# Patient Record
Sex: MALE
[De-identification: names, ages, dates, MRNs, and addresses within clinical notes are randomized per-mention and may not be internally consistent; named-entity substitution may affect disease eponyms.]

---

## 2017-06-20 PROBLEM — Z00.00 ENCOUNTER FOR PREVENTIVE HEALTH EXAMINATION: Status: ACTIVE | Noted: 2017-06-20

## 2017-12-18 ENCOUNTER — RX RENEWAL (OUTPATIENT)
Age: 67
End: 2017-12-18

## 2018-01-14 ENCOUNTER — RESULT CHARGE (OUTPATIENT)
Age: 68
End: 2018-01-14

## 2018-01-15 ENCOUNTER — APPOINTMENT (OUTPATIENT)
Dept: HEART AND VASCULAR | Facility: CLINIC | Age: 68
End: 2018-01-15

## 2018-01-15 ENCOUNTER — APPOINTMENT (OUTPATIENT)
Dept: HEART AND VASCULAR | Facility: CLINIC | Age: 68
End: 2018-01-15
Payer: MEDICARE

## 2018-01-15 VITALS
OXYGEN SATURATION: 98 % | HEART RATE: 69 BPM | WEIGHT: 189.99 LBS | TEMPERATURE: 98.3 F | BODY MASS INDEX: 28.79 KG/M2 | HEIGHT: 68 IN | SYSTOLIC BLOOD PRESSURE: 132 MMHG | DIASTOLIC BLOOD PRESSURE: 80 MMHG

## 2018-01-15 DIAGNOSIS — J34.9 UNSPECIFIED DISORDER OF EAR, UNSPECIFIED EAR: ICD-10-CM

## 2018-01-15 DIAGNOSIS — Q61.5 MEDULLARY CYSTIC KIDNEY: ICD-10-CM

## 2018-01-15 DIAGNOSIS — Z12.9 ENCOUNTER FOR SCREENING FOR MALIGNANT NEOPLASM, SITE UNSPECIFIED: ICD-10-CM

## 2018-01-15 DIAGNOSIS — Z86.69 PERSONAL HISTORY OF OTHER DISEASES OF THE NERVOUS SYSTEM AND SENSE ORGANS: ICD-10-CM

## 2018-01-15 DIAGNOSIS — H93.90 UNSPECIFIED DISORDER OF EAR, UNSPECIFIED EAR: ICD-10-CM

## 2018-01-15 DIAGNOSIS — Z83.3 FAMILY HISTORY OF DIABETES MELLITUS: ICD-10-CM

## 2018-01-15 DIAGNOSIS — Z87.442 PERSONAL HISTORY OF URINARY CALCULI: ICD-10-CM

## 2018-01-15 DIAGNOSIS — Z86.79 PERSONAL HISTORY OF OTHER DISEASES OF THE CIRCULATORY SYSTEM: ICD-10-CM

## 2018-01-15 DIAGNOSIS — Z87.898 PERSONAL HISTORY OF OTHER SPECIFIED CONDITIONS: ICD-10-CM

## 2018-01-15 DIAGNOSIS — Z80.8 FAMILY HISTORY OF MALIGNANT NEOPLASM OF OTHER ORGANS OR SYSTEMS: ICD-10-CM

## 2018-01-15 DIAGNOSIS — Z78.9 OTHER SPECIFIED HEALTH STATUS: ICD-10-CM

## 2018-01-15 DIAGNOSIS — J39.2 UNSPECIFIED DISORDER OF EAR, UNSPECIFIED EAR: ICD-10-CM

## 2018-01-15 DIAGNOSIS — R73.03 PREDIABETES.: ICD-10-CM

## 2018-01-15 DIAGNOSIS — Z98.890 OTHER SPECIFIED POSTPROCEDURAL STATES: ICD-10-CM

## 2018-01-15 DIAGNOSIS — Z87.19 PERSONAL HISTORY OF OTHER DISEASES OF THE DIGESTIVE SYSTEM: ICD-10-CM

## 2018-01-15 DIAGNOSIS — Z86.39 PERSONAL HISTORY OF OTHER ENDOCRINE, NUTRITIONAL AND METABOLIC DISEASE: ICD-10-CM

## 2018-01-15 DIAGNOSIS — Z80.9 FAMILY HISTORY OF MALIGNANT NEOPLASM, UNSPECIFIED: ICD-10-CM

## 2018-01-15 DIAGNOSIS — Z85.9 PERSONAL HISTORY OF MALIGNANT NEOPLASM, UNSPECIFIED: ICD-10-CM

## 2018-01-15 DIAGNOSIS — Z72.0 TOBACCO USE: ICD-10-CM

## 2018-01-15 PROCEDURE — 93000 ELECTROCARDIOGRAM COMPLETE: CPT

## 2018-01-15 PROCEDURE — 36415 COLL VENOUS BLD VENIPUNCTURE: CPT

## 2018-01-15 PROCEDURE — 99214 OFFICE O/P EST MOD 30 MIN: CPT | Mod: 25

## 2018-01-15 RX ORDER — ASPIRIN 81 MG/1
81 TABLET ORAL
Refills: 0 | Status: ACTIVE | COMMUNITY

## 2018-01-15 RX ORDER — UBIQUINOL 100 MG
CAPSULE ORAL
Refills: 0 | Status: ACTIVE | COMMUNITY

## 2018-01-15 RX ORDER — L.ACID,FERM,PLA,RHA/B.BIF,LONG 126 MG
TABLET, DELAYED AND EXTENDED RELEASE ORAL
Refills: 0 | Status: ACTIVE | COMMUNITY

## 2018-01-15 RX ORDER — VIT C/E/ZN/COPPR/LUTEIN/ZEAXAN 250MG-90MG
CAPSULE ORAL
Refills: 0 | Status: ACTIVE | COMMUNITY

## 2018-01-15 RX ORDER — MV-MIN/FOLIC/VIT K/LYCOP/COQ10 200-100MCG
CAPSULE ORAL
Refills: 0 | Status: ACTIVE | COMMUNITY

## 2018-01-16 LAB
ALBUMIN SERPL ELPH-MCNC: 5 G/DL
ALP BLD-CCNC: 78 U/L
ALT SERPL-CCNC: 35 U/L
ANION GAP SERPL CALC-SCNC: 16 MMOL/L
APPEARANCE: CLEAR
AST SERPL-CCNC: 30 U/L
BACTERIA: NEGATIVE
BASOPHILS # BLD AUTO: 0.04 K/UL
BASOPHILS NFR BLD AUTO: 0.4 %
BILIRUB SERPL-MCNC: 0.4 MG/DL
BILIRUBIN URINE: NEGATIVE
BLOOD URINE: NEGATIVE
BUN SERPL-MCNC: 22 MG/DL
CALCIUM SERPL-MCNC: 9.8 MG/DL
CHLORIDE SERPL-SCNC: 104 MMOL/L
CHOLEST SERPL-MCNC: 210 MG/DL
CHOLEST/HDLC SERPL: 4.7 RATIO
CO2 SERPL-SCNC: 24 MMOL/L
COLOR: YELLOW
CREAT SERPL-MCNC: 1.6 MG/DL
CRP SERPL HS-MCNC: 1.2 MG/L
EOSINOPHIL # BLD AUTO: 0.24 K/UL
EOSINOPHIL NFR BLD AUTO: 2.7 %
GLUCOSE QUALITATIVE U: NEGATIVE MG/DL
GLUCOSE SERPL-MCNC: 92 MG/DL
HBA1C MFR BLD HPLC: 5.7 %
HCT VFR BLD CALC: 47.8 %
HDLC SERPL-MCNC: 45 MG/DL
HGB BLD-MCNC: 15.8 G/DL
HYALINE CASTS: 1 /LPF
IMM GRANULOCYTES NFR BLD AUTO: 0.2 %
KETONES URINE: NEGATIVE
LDLC SERPL CALC-MCNC: 132 MG/DL
LEUKOCYTE ESTERASE URINE: NEGATIVE
LYMPHOCYTES # BLD AUTO: 1.7 K/UL
LYMPHOCYTES NFR BLD AUTO: 18.8 %
MAN DIFF?: NORMAL
MCHC RBC-ENTMCNC: 28.6 PG
MCHC RBC-ENTMCNC: 33.1 GM/DL
MCV RBC AUTO: 86.6 FL
MICROSCOPIC-UA: NORMAL
MONOCYTES # BLD AUTO: 0.53 K/UL
MONOCYTES NFR BLD AUTO: 5.9 %
NEUTROPHILS # BLD AUTO: 6.5 K/UL
NEUTROPHILS NFR BLD AUTO: 72 %
NITRITE URINE: NEGATIVE
PH URINE: 5
PLATELET # BLD AUTO: 240 K/UL
POTASSIUM SERPL-SCNC: 4.2 MMOL/L
PROT SERPL-MCNC: 7.3 G/DL
PROTEIN URINE: NEGATIVE MG/DL
PSA SERPL-MCNC: 2.59 NG/ML
RBC # BLD: 5.52 M/UL
RBC # FLD: 13.4 %
RED BLOOD CELLS URINE: 0 /HPF
SODIUM SERPL-SCNC: 144 MMOL/L
SPECIFIC GRAVITY URINE: 1.02
SQUAMOUS EPITHELIAL CELLS: 1 /HPF
TRIGL SERPL-MCNC: 163 MG/DL
UROBILINOGEN URINE: NEGATIVE MG/DL
WBC # FLD AUTO: 9.03 K/UL
WHITE BLOOD CELLS URINE: 2 /HPF

## 2018-01-24 ENCOUNTER — RX RENEWAL (OUTPATIENT)
Age: 68
End: 2018-01-24

## 2018-01-25 LAB — HEMOCCULT STL QL IA: NEGATIVE

## 2018-01-30 ENCOUNTER — RX RENEWAL (OUTPATIENT)
Age: 68
End: 2018-01-30

## 2018-01-31 ENCOUNTER — RX RENEWAL (OUTPATIENT)
Age: 68
End: 2018-01-31

## 2018-02-02 ENCOUNTER — RX RENEWAL (OUTPATIENT)
Age: 68
End: 2018-02-02

## 2018-02-02 RX ORDER — ATORVASTATIN CALCIUM 10 MG/1
10 TABLET, FILM COATED ORAL
Qty: 90 | Refills: 3 | Status: ACTIVE | COMMUNITY
Start: 2018-02-02 | End: 1900-01-01

## 2019-02-15 ENCOUNTER — RX RENEWAL (OUTPATIENT)
Age: 69
End: 2019-02-15

## 2019-02-19 ENCOUNTER — RX RENEWAL (OUTPATIENT)
Age: 69
End: 2019-02-19

## 2019-03-11 ENCOUNTER — LABORATORY RESULT (OUTPATIENT)
Age: 69
End: 2019-03-11

## 2019-03-11 ENCOUNTER — APPOINTMENT (OUTPATIENT)
Dept: HEART AND VASCULAR | Facility: CLINIC | Age: 69
End: 2019-03-11
Payer: MEDICARE

## 2019-03-11 VITALS
SYSTOLIC BLOOD PRESSURE: 120 MMHG | TEMPERATURE: 97.6 F | HEIGHT: 68 IN | OXYGEN SATURATION: 97 % | HEART RATE: 83 BPM | WEIGHT: 195.37 LBS | DIASTOLIC BLOOD PRESSURE: 70 MMHG | BODY MASS INDEX: 29.61 KG/M2

## 2019-03-11 PROCEDURE — 99214 OFFICE O/P EST MOD 30 MIN: CPT

## 2019-03-11 PROCEDURE — 93000 ELECTROCARDIOGRAM COMPLETE: CPT

## 2019-03-11 NOTE — HISTORY OF PRESENT ILLNESS
[FreeTextEntry1] : 3/11/19 USOH, 100% compliant with meds\par location: chest\par duration: na\par  modifying factors: na\par timing: na\par severity: 0/10\par EKG unchanged from prior (in chart)\par consultation notes reviewed where appropriate\par

## 2019-03-11 NOTE — PHYSICAL EXAM
[General Appearance - Well Developed] : well developed [Normal Appearance] : normal appearance [Well Groomed] : well groomed [General Appearance - Well Nourished] : well nourished [No Deformities] : no deformities [General Appearance - In No Acute Distress] : no acute distress [Normal Jugular Venous A Waves Present] : normal jugular venous A waves present [Normal Jugular Venous V Waves Present] : normal jugular venous V waves present [No Jugular Venous Bales A Waves] : no jugular venous bales A waves [Respiration, Rhythm And Depth] : normal respiratory rhythm and effort [Exaggerated Use Of Accessory Muscles For Inspiration] : no accessory muscle use [Auscultation Breath Sounds / Voice Sounds] : lungs were clear to auscultation bilaterally [Heart Rate And Rhythm] : heart rate and rhythm were normal [Heart Sounds] : normal S1 and S2 [Murmurs] : no murmurs present [Nail Clubbing] : no clubbing of the fingernails [Cyanosis, Localized] : no localized cyanosis [Petechial Hemorrhages (___cm)] : no petechial hemorrhages [] : no ischemic changes

## 2019-03-11 NOTE — DISCUSSION/SUMMARY
[FreeTextEntry1] : The number of diagnostic and/or management options \par \par HLD- on Lipitor repeat fasting lipid\par \par Prediabetes- diet reviewed, new labs today\par \par EMILIA- NOT TREATED, also has a deviated septum which may not be the trigger for his snoring\par \par full labs today\par \par \par Labs, radiology: ekg\par \par Aspirin therapy: yes\par \par LDL: statin\par \par Overall Risk: High Complexity\par \par Additional 8 min detailed discussion regarding prevention including but not limiting to goal SBP<130mmHg, Mediterranean diet discussion, No salt diet, diabetes screening, and goal LDL<100. Smoking cessation, EtOH use and depression screen where applicable\par Exercise counseling and plan discussed with patient\par will readdress and reinforce prevention in subsequent visits\par \par

## 2019-03-12 LAB
ALBUMIN SERPL ELPH-MCNC: 5.1 G/DL
ALP BLD-CCNC: 72 U/L
ALT SERPL-CCNC: 36 U/L
ANION GAP SERPL CALC-SCNC: 14 MMOL/L
AST SERPL-CCNC: 24 U/L
BASOPHILS # BLD AUTO: 0.08 K/UL
BASOPHILS NFR BLD AUTO: 1 %
BILIRUB SERPL-MCNC: 0.4 MG/DL
BUN SERPL-MCNC: 19 MG/DL
CALCIUM SERPL-MCNC: 10.3 MG/DL
CHLORIDE SERPL-SCNC: 104 MMOL/L
CO2 SERPL-SCNC: 24 MMOL/L
CREAT SERPL-MCNC: 1.48 MG/DL
EOSINOPHIL # BLD AUTO: 0.24 K/UL
EOSINOPHIL NFR BLD AUTO: 2.9 %
GLUCOSE SERPL-MCNC: 88 MG/DL
HBA1C MFR BLD HPLC: 5.8 %
HCT VFR BLD CALC: 48.5 %
HGB BLD-MCNC: 15.3 G/DL
IMM GRANULOCYTES NFR BLD AUTO: 0.2 %
LYMPHOCYTES # BLD AUTO: 1.56 K/UL
LYMPHOCYTES NFR BLD AUTO: 18.8 %
MAN DIFF?: NORMAL
MCHC RBC-ENTMCNC: 28.1 PG
MCHC RBC-ENTMCNC: 31.5 GM/DL
MCV RBC AUTO: 89 FL
MONOCYTES # BLD AUTO: 0.53 K/UL
MONOCYTES NFR BLD AUTO: 6.4 %
NEUTROPHILS # BLD AUTO: 5.89 K/UL
NEUTROPHILS NFR BLD AUTO: 70.7 %
PLATELET # BLD AUTO: 289 K/UL
POTASSIUM SERPL-SCNC: 4.3 MMOL/L
PROT SERPL-MCNC: 7.1 G/DL
RBC # BLD: 5.45 M/UL
RBC # FLD: 13.4 %
SODIUM SERPL-SCNC: 142 MMOL/L
WBC # FLD AUTO: 8.32 K/UL

## 2019-03-13 ENCOUNTER — MEDICATION RENEWAL (OUTPATIENT)
Age: 69
End: 2019-03-13

## 2019-03-13 RX ORDER — ATORVASTATIN CALCIUM 20 MG/1
20 TABLET, FILM COATED ORAL
Qty: 90 | Refills: 3 | Status: ACTIVE | COMMUNITY
Start: 2017-11-07 | End: 1900-01-01

## 2020-07-02 RX ORDER — SERTRALINE HYDROCHLORIDE 50 MG/1
50 TABLET, FILM COATED ORAL
Qty: 135 | Refills: 0 | Status: ACTIVE | COMMUNITY
Start: 2017-12-18 | End: 1900-01-01

## 2020-07-27 ENCOUNTER — APPOINTMENT (OUTPATIENT)
Dept: HEART AND VASCULAR | Facility: CLINIC | Age: 70
End: 2020-07-27

## 2020-07-27 DIAGNOSIS — R73.03 PREDIABETES.: ICD-10-CM

## 2020-07-27 DIAGNOSIS — E78.5 HYPERLIPIDEMIA, UNSPECIFIED: ICD-10-CM

## 2020-07-27 DIAGNOSIS — I10 ESSENTIAL (PRIMARY) HYPERTENSION: ICD-10-CM

## 2020-07-27 NOTE — PHYSICAL EXAM
[General Appearance - Well Developed] : well developed [Well Groomed] : well groomed [Normal Appearance] : normal appearance [No Deformities] : no deformities [General Appearance - In No Acute Distress] : no acute distress [General Appearance - Well Nourished] : well nourished [Eyelids - No Xanthelasma] : the eyelids demonstrated no xanthelasmas [Normal Conjunctiva] : the conjunctiva exhibited no abnormalities [Normal Oral Mucosa] : normal oral mucosa [No Oral Cyanosis] : no oral cyanosis [No Oral Pallor] : no oral pallor [Normal Jugular Venous A Waves Present] : normal jugular venous A waves present [Normal Jugular Venous V Waves Present] : normal jugular venous V waves present [Respiration, Rhythm And Depth] : normal respiratory rhythm and effort [No Jugular Venous Bales A Waves] : no jugular venous bales A waves [Exaggerated Use Of Accessory Muscles For Inspiration] : no accessory muscle use [Heart Rate And Rhythm] : heart rate and rhythm were normal [Auscultation Breath Sounds / Voice Sounds] : lungs were clear to auscultation bilaterally [Heart Sounds] : normal S1 and S2 [Murmurs] : no murmurs present [Abdomen Tenderness] : non-tender [Abdomen Soft] : soft [Abdomen Mass (___ Cm)] : no abdominal mass palpated [FreeTextEntry1] : UMBILICAL HERNIA [Nail Clubbing] : no clubbing of the fingernails [Gait - Sufficient For Exercise Testing] : the gait was sufficient for exercise testing [Abnormal Walk] : normal gait [Cyanosis, Localized] : no localized cyanosis [Petechial Hemorrhages (___cm)] : no petechial hemorrhages [Skin Color & Pigmentation] : normal skin color and pigmentation [] : no rash [Skin Lesions] : no skin lesions [No Skin Ulcers] : no skin ulcer [No Venous Stasis] : no venous stasis [Affect] : the affect was normal [No Xanthoma] : no  xanthoma was observed [Oriented To Time, Place, And Person] : oriented to person, place, and time [Mood] : the mood was normal [No Anxiety] : not feeling anxious

## 2020-07-27 NOTE — ASSESSMENT
[FreeTextEntry1] : HLD- on Lipitor\par \par Prediabetes- diet reviewed, new labs today\par \par EMILIA- NOT TREATED, also has a deviated septum which may not be the trigger for his snoring\par \par Cancer Screening- needs colonoscopy, stool card given\par \par Multiple hernias- referrred for surgery consult in his home town

## 2020-07-27 NOTE — REASON FOR VISIT
[Hyperlipidemia] : hyperlipidemia [Follow-Up - Clinic] : a clinic follow-up of [FreeTextEntry1] : Now 67, last here Dec 2016\par \par Needs colonoscopy\par Multiple hernias\par Deviated septum\par Jena\par \par EKG: NSR, PVC, normal axis and intervals, tall R wave in V1, no ST-Tw abnormalities. 1/15/18

## 2021-10-06 PROBLEM — I10 ESSENTIAL HYPERTENSION: Status: ACTIVE | Noted: 2018-01-15
